# Patient Record
Sex: FEMALE
[De-identification: names, ages, dates, MRNs, and addresses within clinical notes are randomized per-mention and may not be internally consistent; named-entity substitution may affect disease eponyms.]

---

## 2024-09-09 PROBLEM — Z00.00 ENCOUNTER FOR PREVENTIVE HEALTH EXAMINATION: Status: ACTIVE | Noted: 2024-09-09

## 2024-09-11 ENCOUNTER — APPOINTMENT (OUTPATIENT)
Dept: OBGYN | Facility: CLINIC | Age: 34
End: 2024-09-11
Payer: COMMERCIAL

## 2024-09-11 VITALS
HEIGHT: 63 IN | BODY MASS INDEX: 20.73 KG/M2 | DIASTOLIC BLOOD PRESSURE: 70 MMHG | WEIGHT: 117 LBS | SYSTOLIC BLOOD PRESSURE: 110 MMHG

## 2024-09-11 DIAGNOSIS — Z78.9 OTHER SPECIFIED HEALTH STATUS: ICD-10-CM

## 2024-09-11 DIAGNOSIS — Z31.69 ENCOUNTER FOR OTHER GENERAL COUNSELING AND ADVICE ON PROCREATION: ICD-10-CM

## 2024-09-11 PROCEDURE — 99202 OFFICE O/P NEW SF 15 MIN: CPT

## 2024-09-11 NOTE — HISTORY OF PRESENT ILLNESS
[N] : Patient denies prior pregnancies [Currently Active] : currently active [Men] : men [No] : No [FreeTextEntry1] : Patient is a 33-year-old female who was originally scheduled for her annual visit, but menses started yesterday and is heavy today, so wishes to defer.  Patient is a former patient of our practice, was seeing Chio Hamilton, and wished to come in today just to establish care in this [facility] system.  Patient not  a year and a half ago and she and her  are looking to start conceiving in the next year.  I explained to patient my affiliation with [facility] and the laborist system there as well as how my obstetrical practice works.  I encourage patient to start a prenatal vitamin and reviewed preconception counseling with her.  Patient was comfortable with the way my practice is run and wishes to continue care with me.  Will be scheduling her annual visit at her convenience in the next several weeks, and will return when she gets pregnant. [PapSmeardate] : 2023 [TextBox_31] : Negative [LMPDate] : 09/09/24

## 2024-09-16 ENCOUNTER — APPOINTMENT (OUTPATIENT)
Dept: OBGYN | Facility: CLINIC | Age: 34
End: 2024-09-16

## 2024-10-01 ENCOUNTER — APPOINTMENT (OUTPATIENT)
Dept: OBGYN | Facility: CLINIC | Age: 34
End: 2024-10-01
Payer: COMMERCIAL

## 2024-10-01 VITALS
BODY MASS INDEX: 20.38 KG/M2 | HEIGHT: 63 IN | WEIGHT: 115 LBS | SYSTOLIC BLOOD PRESSURE: 100 MMHG | DIASTOLIC BLOOD PRESSURE: 68 MMHG

## 2024-10-01 DIAGNOSIS — Z01.419 ENCOUNTER FOR GYNECOLOGICAL EXAMINATION (GENERAL) (ROUTINE) W/OUT ABNORMAL FINDINGS: ICD-10-CM

## 2024-10-01 DIAGNOSIS — Z12.4 ENCOUNTER FOR SCREENING FOR MALIGNANT NEOPLASM OF CERVIX: ICD-10-CM

## 2024-10-01 PROCEDURE — 99395 PREV VISIT EST AGE 18-39: CPT

## 2024-10-01 NOTE — PLAN
[FreeTextEntry1] : Pap smear drawn and sent.  Will continue on prenatal vitamins.  Will return when hopefully conceives.

## 2024-10-01 NOTE — PHYSICAL EXAM
[Appropriately responsive] : appropriately responsive [Alert] : alert [No Acute Distress] : no acute distress [No Lymphadenopathy] : no lymphadenopathy [Regular Rate Rhythm] : regular rate rhythm [No Murmurs] : no murmurs [Clear to Auscultation B/L] : clear to auscultation bilaterally [Soft] : soft [Non-tender] : non-tender [Non-distended] : non-distended [No HSM] : No HSM [No Lesions] : no lesions [No Mass] : no mass [Oriented x3] : oriented x3 [FreeTextEntry1] : Normal, no lesions [FreeTextEntry2] : Normal, no lesions [FreeTextEntry4] : Normal, no lesions seen or palpated.  Trace white discharge in vault [FreeTextEntry5] : Smooth, pink, no lesions.  No cervical motion tenderness [FreeTextEntry6] : Retroverted, small, mobile, nontender.  No adnexal masses or tenderness bilaterally

## 2024-10-01 NOTE — HISTORY OF PRESENT ILLNESS
[N] : Patient denies prior pregnancies [Currently Active] : currently active [Men] : men [FreeTextEntry1] : Patient here for annual visit. Menses have been monthly. No breast lumps, pain or D/C. No other GYN complaints.  [PapSmeardate] : 2023 [TextBox_31] : Negative [LMPDate] : 09/09/24 [No] : No

## 2024-10-02 ENCOUNTER — APPOINTMENT (OUTPATIENT)
Dept: OBGYN | Facility: CLINIC | Age: 34
End: 2024-10-02

## 2024-10-09 LAB — CYTOLOGY CVX/VAG DOC THIN PREP: ABNORMAL

## 2024-11-20 ENCOUNTER — OFFICE (OUTPATIENT)
Dept: URBAN - METROPOLITAN AREA CLINIC 113 | Facility: CLINIC | Age: 34
Setting detail: OPHTHALMOLOGY
End: 2024-11-20
Payer: COMMERCIAL

## 2024-11-20 DIAGNOSIS — H02.89: ICD-10-CM

## 2024-11-20 DIAGNOSIS — H52.13: ICD-10-CM

## 2024-11-20 PROCEDURE — 92015 DETERMINE REFRACTIVE STATE: CPT | Performed by: OPTOMETRIST

## 2024-11-20 PROCEDURE — 92004 COMPRE OPH EXAM NEW PT 1/>: CPT | Performed by: OPTOMETRIST

## 2024-11-20 ASSESSMENT — KERATOMETRY
OS_AXISANGLE_DEGREES: 125
OD_K2POWER_DIOPTERS: 44.00
OD_AXISANGLE_DEGREES: 067
OS_K1POWER_DIOPTERS: 43.50
OD_K1POWER_DIOPTERS: 43.50
OS_K2POWER_DIOPTERS: 44.00

## 2024-11-20 ASSESSMENT — CONFRONTATIONAL VISUAL FIELD TEST (CVF)
OS_FINDINGS: FULL
OD_FINDINGS: FULL

## 2024-11-20 ASSESSMENT — LID EXAM ASSESSMENTS
OS_MEIBOMITIS: LLL LUL T 1+
OD_MEIBOMITIS: RLL RUL T 1+

## 2024-11-20 ASSESSMENT — VISUAL ACUITY
OD_BCVA: 20/20
OS_BCVA: 20/20

## 2024-11-20 ASSESSMENT — TONOMETRY
OS_IOP_MMHG: 12
OD_IOP_MMHG: 13

## 2024-11-20 ASSESSMENT — REFRACTION_MANIFEST
OS_AXIS: 050
OS_VA1: 20/20
OS_CYLINDER: -0.50
OD_CYLINDER: SPH
OS_SPHERE: -0.25
OD_SPHERE: -0.50
OD_VA1: 20/20

## 2024-11-20 ASSESSMENT — REFRACTION_AUTOREFRACTION
OS_AXIS: 052
OD_SPHERE: -0.50
OD_CYLINDER: -0.25
OS_CYLINDER: -0.50
OD_AXIS: 146
OS_SPHERE: -0.25